# Patient Record
Sex: FEMALE | Race: WHITE | NOT HISPANIC OR LATINO | Employment: FULL TIME | URBAN - METROPOLITAN AREA
[De-identification: names, ages, dates, MRNs, and addresses within clinical notes are randomized per-mention and may not be internally consistent; named-entity substitution may affect disease eponyms.]

---

## 2022-03-17 ENCOUNTER — TELEPHONE (OUTPATIENT)
Dept: OBSTETRICS AND GYNECOLOGY | Facility: CLINIC | Age: 42
End: 2022-03-17
Payer: COMMERCIAL

## 2022-03-28 ENCOUNTER — PROCEDURE VISIT (OUTPATIENT)
Dept: OBSTETRICS AND GYNECOLOGY | Facility: CLINIC | Age: 42
End: 2022-03-28
Payer: COMMERCIAL

## 2022-03-28 ENCOUNTER — OFFICE VISIT (OUTPATIENT)
Dept: OBSTETRICS AND GYNECOLOGY | Facility: CLINIC | Age: 42
End: 2022-03-28
Payer: COMMERCIAL

## 2022-03-28 VITALS
HEIGHT: 68 IN | SYSTOLIC BLOOD PRESSURE: 110 MMHG | WEIGHT: 148.63 LBS | BODY MASS INDEX: 22.53 KG/M2 | DIASTOLIC BLOOD PRESSURE: 62 MMHG

## 2022-03-28 DIAGNOSIS — N93.9 ABNORMAL UTERINE BLEEDING (AUB): Primary | ICD-10-CM

## 2022-03-28 DIAGNOSIS — N93.9 ABNORMAL UTERINE BLEEDING (AUB): ICD-10-CM

## 2022-03-28 PROCEDURE — 99204 PR OFFICE/OUTPT VISIT, NEW, LEVL IV, 45-59 MIN: ICD-10-PCS | Mod: S$GLB,,, | Performed by: OBSTETRICS & GYNECOLOGY

## 2022-03-28 PROCEDURE — 99204 OFFICE O/P NEW MOD 45 MIN: CPT | Mod: S$GLB,,, | Performed by: OBSTETRICS & GYNECOLOGY

## 2022-03-28 PROCEDURE — 76856 US EXAM PELVIC COMPLETE: CPT | Mod: S$GLB,,, | Performed by: OBSTETRICS & GYNECOLOGY

## 2022-03-28 PROCEDURE — 76856 PR  ECHO,PELVIC (NONOBSTETRIC): ICD-10-PCS | Mod: S$GLB,,, | Performed by: OBSTETRICS & GYNECOLOGY

## 2022-03-28 RX ORDER — ONDANSETRON 4 MG/1
4 TABLET, FILM COATED ORAL EVERY 6 HOURS PRN
Qty: 20 TABLET | Refills: 1 | Status: SHIPPED | OUTPATIENT
Start: 2022-03-28 | End: 2022-04-04

## 2022-03-28 RX ORDER — TRANEXAMIC ACID 650 MG/1
650 TABLET ORAL 3 TIMES DAILY
Qty: 15 TABLET | Refills: 1 | Status: SHIPPED | OUTPATIENT
Start: 2022-03-28 | End: 2022-04-02

## 2022-03-28 RX ORDER — NORGESTIMATE AND ETHINYL ESTRADIOL 7DAYSX3 28
1 KIT ORAL DAILY
COMMUNITY
End: 2022-03-28

## 2022-03-28 RX ORDER — NORGESTIMATE AND ETHINYL ESTRADIOL 0.25-0.035
1 KIT ORAL DAILY
Qty: 28 TABLET | Refills: 11 | Status: SHIPPED | OUTPATIENT
Start: 2022-03-28 | End: 2023-05-09

## 2022-03-28 NOTE — PROGRESS NOTES
Ultrasound completed and read     the uterus measures 8.9 cm in length, with an endometrial thickness of 0.49 cm. The right ovary is normal.  The left ovary contains 2 simple cysts measuring 3 cm and 2 cm

## 2022-03-29 NOTE — PROGRESS NOTES
Gynecology visit  History & Physical      SUBJECTIVE:     History of Present Illness:   41 y.o. female presents complaining of 3 months of amenorrhea and then having menorrhagia for 27 days.  States that when she had amenorrhea, she had egg white like discharge similar to ovulation type discharge. Reports that it has resolved. She and her  use vasectomy for contraception. She was a patient of Dr. Romeo prior to his passing.  She saw all Dr. Bernard for this problem.  Pelvic ultrasound in his office showed an endometrial stripe greater than 2 cm.  She was given Lo Loestrin, but reports she has continued to have heavy bleeding.  Reports her hormone panel was normal.  She endorses symptoms of anemia.       Chief Complaint   Patient presents with    irregular bleeding     Pt is new to the clinic.       Review of patient's allergies indicates:  No Known Allergies    Current Outpatient Medications   Medication Sig Dispense Refill    norgestimate-ethinyl estradioL (ORTHO-CYCLEN) 0.25-35 mg-mcg per tablet Take 1 tablet by mouth once daily. 28 tablet 11    ondansetron (ZOFRAN) 4 MG tablet Take 1 tablet (4 mg total) by mouth every 6 (six) hours as needed for Nausea. 20 tablet 1    tranexamic acid (LYSTEDA) 650 mg tablet Take 1 tablet (650 mg total) by mouth 3 (three) times daily. for 5 days 15 tablet 1     No current facility-administered medications for this visit.     OB History        4    Para        Term                AB        Living   3       SAB        IAB        Ectopic        Multiple        Live Births                 No LMP recorded (lmp unknown). (Menstrual status: Birth Control).      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      OBJECTIVE:     Vital Signs (Most Recent)  BP: 110/62 (03/28/22 1332)  5' 8" (1.727 m)  67.4 kg (148 lb 9.6 oz)     Physical Exam:  Physical Exam  Vitals reviewed.   Constitutional:       General: She is not in acute distress.     Appearance: Normal appearance. She is normal weight. She is not ill-appearing.   HENT:      Head: Normocephalic and atraumatic.   Pulmonary:      Effort: Pulmonary effort is normal.   Neurological:      General: No focal deficit present.      Mental Status: She is alert and oriented to person, place, and time.   Psychiatric:         Mood and Affect: Mood normal.         Behavior: Behavior normal.         Diagnostic Results:  Pelvic ultrasound performed on 03/28/2022:  Uterus measures 8.9 x 5.6 x 4.9 cm  Endometrial thickness measures 4.9 mm  Right ovary is normal in appearance  Left ovary contains 2 simple cyst measuring 3cm and 2 cm    ASSESSMENT/PLAN:       ICD-10-CM ICD-9-CM   1. Abnormal uterine bleeding (AUB)  N93.9 626.9       PLAN:    --Instructed patient to take Lysteda for 5 days to decrease uterine bleeding.    --If she is still experiencing heavy bleeding on Sunday, recommend OCP taper with 4 pills on Sunday, followed by 3 pills on Monday, followed by 2 pills the next day and then 1 pill daily.  --Zofran ordered for estrogen induced nausea with OCP taper.  --Reviewed treatment options of hormones verses progesterone IUD versus endometrial ablation and lastly hysterectomy.  Patient is interested in endometrial ablation.  --Pelvic ultrasound performed today that showed an endometrial stripe of 4.8 mm, which is significantly less than 2 cm.  This heavy bleeding was likely her uterus sloughing the thick endometrium.  --CBC ordered to assess hemoglobin level.  --Recommend oral iron.  --Follow-up in 2-4 weeks " to assess bleeding symptoms and further discuss treatment options.  --Pt thankful for care provided.     Kriss Vega MD   Gynecology    [address]  [address]  [place] [address] [phone]

## 2022-04-08 ENCOUNTER — TELEPHONE (OUTPATIENT)
Dept: OBSTETRICS AND GYNECOLOGY | Facility: CLINIC | Age: 42
End: 2022-04-08
Payer: COMMERCIAL

## 2022-04-08 DIAGNOSIS — N93.9 ABNORMAL UTERINE BLEEDING: ICD-10-CM

## 2022-04-08 DIAGNOSIS — R53.83 LETHARGY: Primary | ICD-10-CM

## 2022-04-08 NOTE — TELEPHONE ENCOUNTER
----- Message from Socorro Lou sent at 4/8/2022 10:08 AM CDT -----  Contact: pt  Type: Needs Medical Advice         Who Called: pt  Best Call Back Number:[phone]  Additional Information: Requesting a call back regarding  pt called last Thursday to have orders for bloodwork to be sent to [facility]. Pt hasn't hear back from office and would like to have the labs done. Pt would like office to call her.   Please Advise- Thank you

## 2022-06-01 ENCOUNTER — OFFICE VISIT (OUTPATIENT)
Dept: OBSTETRICS AND GYNECOLOGY | Facility: CLINIC | Age: 42
End: 2022-06-01
Payer: COMMERCIAL

## 2022-06-01 VITALS
DIASTOLIC BLOOD PRESSURE: 78 MMHG | HEIGHT: 68 IN | WEIGHT: 142.81 LBS | SYSTOLIC BLOOD PRESSURE: 122 MMHG | BODY MASS INDEX: 21.64 KG/M2

## 2022-06-01 DIAGNOSIS — N93.9 ABNORMAL UTERINE BLEEDING: Primary | ICD-10-CM

## 2022-06-01 DIAGNOSIS — F41.9 ANXIETY: ICD-10-CM

## 2022-06-01 PROCEDURE — 88305 TISSUE EXAM BY PATHOLOGIST: ICD-10-PCS | Mod: 26,,, | Performed by: PATHOLOGY

## 2022-06-01 PROCEDURE — 99214 PR OFFICE/OUTPT VISIT, EST, LEVL IV, 30-39 MIN: ICD-10-PCS | Mod: 25,S$GLB,, | Performed by: OBSTETRICS & GYNECOLOGY

## 2022-06-01 PROCEDURE — 58100 ENDOMETRIAL BIOPSY: ICD-10-PCS | Mod: S$GLB,,, | Performed by: OBSTETRICS & GYNECOLOGY

## 2022-06-01 PROCEDURE — 58100 BIOPSY OF UTERUS LINING: CPT | Mod: S$GLB,,, | Performed by: OBSTETRICS & GYNECOLOGY

## 2022-06-01 PROCEDURE — 99214 OFFICE O/P EST MOD 30 MIN: CPT | Mod: 25,S$GLB,, | Performed by: OBSTETRICS & GYNECOLOGY

## 2022-06-01 PROCEDURE — 88305 TISSUE EXAM BY PATHOLOGIST: CPT | Mod: 26,,, | Performed by: PATHOLOGY

## 2022-06-01 PROCEDURE — 88305 TISSUE EXAM BY PATHOLOGIST: CPT | Performed by: PATHOLOGY

## 2022-06-01 RX ORDER — TRANEXAMIC ACID 650 MG/1
1300 TABLET ORAL 3 TIMES DAILY
Qty: 15 TABLET | Refills: 11 | Status: SHIPPED | OUTPATIENT
Start: 2022-06-01 | End: 2022-06-06

## 2022-06-01 RX ORDER — SUCRALFATE 1 G/1
1 TABLET ORAL 4 TIMES DAILY
COMMUNITY

## 2022-06-01 RX ORDER — PANTOPRAZOLE SODIUM 40 MG/1
80 FOR SUSPENSION ORAL DAILY
COMMUNITY

## 2022-06-01 RX ORDER — BUPROPION HYDROCHLORIDE 150 MG/1
150 TABLET, EXTENDED RELEASE ORAL DAILY
Qty: 30 TABLET | Refills: 11 | Status: SHIPPED | OUTPATIENT
Start: 2022-06-01 | End: 2022-09-28 | Stop reason: SDUPTHER

## 2022-06-01 NOTE — PROCEDURES
Endometrial biopsy    Date/Time: 6/1/2022 2:15 PM  Performed by: Kriss Vega MD  Authorized by: Kriss Vega MD     Consent:     Consent obtained:  Written    Consent given by:  Patient    Patient questions answered: yes      Patient agrees, verbalizes understanding, and wants to proceed: yes      Instructions and paperwork completed: yes    Indication:     Indications: Menorrhagia    Pre-procedure:     Pre-procedure timeout performed: yes    Procedure:     Procedure: endometrial biopsy with Pipelle      Cervix cleaned and prepped: yes      A paracervical block was performed: yes      Uterus sound depth (cm):  9    Specimen collected: specimen collected and sent to pathology      Patient tolerated procedure well with no complications: yes    Comments:     Procedure comments:          Kriss Vega MD   Gynecology    [address]  [address]  [place] [address] [phone]

## 2022-06-01 NOTE — PROGRESS NOTES
"Gynecology Visit   EMB    Subjective:       Patient ID: Maryan Moore is a 41 y.o. female.    Chief Complaint:  Follow-up (Pt presents to discuss treatment options )      History of Present Illness  41-year-old presents for follow-up for abnormal uterine bleeding.  Reports her bleeding did stop after taking Lysteda with OCP taper.  Reports she began bleeding again a week later despite continuing birth control pills.  Reports continued mucous discharge with bleeding. She reports an increase in anxiety. Reports she has been taking Prozac 10 mg for the past month with some improvement.  Reports a decrease in libido with this medication. Desires treatment.  She is concerned that she is developing a gastric ulcer, and has started daily Protonix.    She is scheduled to go out of the country on a delayed honeymoon with her  starting  x1 week.    GYN & OB History  No LMP recorded. (Menstrual status: Birth Control).   Date of Last Pap: No result found  , twins x1        /78 (BP Location: Right arm, Patient Position: Sitting)   Ht 5' 8" (1.727 m)   Wt 64.8 kg (142 lb 12.8 oz)   BMI 21.71 kg/m²     Objective:    Physical Exam:   Constitutional: She is oriented to person, place, and time. She appears well-developed and well-nourished.    HENT:   Head: Normocephalic and atraumatic.       Pulmonary/Chest: Effort normal.          Genitourinary:    Uterus normal.   The external female genitalia was normal.   There is vaginal discharge in the vagina. Uterus is not tender.    Genitourinary Comments: Endometrial biopsy performed.  See procedure note for details.                 Neurological: She is alert and oriented to person, place, and time.     Psychiatric: She has a normal mood and affect.            Pelvic ultrasound performed on 2022:  Uterus measures 8.9 x 5.6 x 4.9 cm  Endometrial thickness measures 4.9 mm  Right ovary is normal in appearance  Left ovary contains 2 simple cyst measuring 3cm " and 2 cm    Assessment:        1. Abnormal uterine bleeding    2. Anxiety             Plan:      --Continued irregular bleeding despite hormonal treatment.   --Recommend endometrial biopsy.  Performed today without difficulty.  Patient tolerated well.  --Patient is planning to swim and have intercourse on her trip, so will try to manage bleeding during that 7 days with Lysteda and OCP taper.   --When patient returns, we will further discuss treatment options of IUD versus ablation versus hysterectomy.   --Wellbutrin ordered for anxiety.  Patient counseled on proper use.  --Recommend GI referral/workup.  Patient reports she will follow-up with Dr. Bruner.         Kriss Vega MD   Gynecology    [address]   [address]  [place] [address] [phone]

## 2022-06-06 ENCOUNTER — PATIENT MESSAGE (OUTPATIENT)
Dept: OBSTETRICS AND GYNECOLOGY | Facility: CLINIC | Age: 42
End: 2022-06-06
Payer: COMMERCIAL

## 2022-06-07 LAB
FINAL PATHOLOGIC DIAGNOSIS: NORMAL
GROSS: NORMAL
Lab: NORMAL

## 2022-09-26 ENCOUNTER — PATIENT MESSAGE (OUTPATIENT)
Dept: OBSTETRICS AND GYNECOLOGY | Facility: CLINIC | Age: 42
End: 2022-09-26
Payer: COMMERCIAL

## 2022-09-28 ENCOUNTER — OFFICE VISIT (OUTPATIENT)
Dept: OBSTETRICS AND GYNECOLOGY | Facility: CLINIC | Age: 42
End: 2022-09-28
Payer: COMMERCIAL

## 2022-09-28 VITALS
HEIGHT: 68 IN | SYSTOLIC BLOOD PRESSURE: 116 MMHG | DIASTOLIC BLOOD PRESSURE: 72 MMHG | WEIGHT: 144 LBS | BODY MASS INDEX: 21.82 KG/M2

## 2022-09-28 DIAGNOSIS — N89.8 VAGINAL ODOR: ICD-10-CM

## 2022-09-28 DIAGNOSIS — N91.2 AMENORRHEA: Primary | ICD-10-CM

## 2022-09-28 DIAGNOSIS — E55.9 VITAMIN D DEFICIENCY: ICD-10-CM

## 2022-09-28 DIAGNOSIS — N93.9 ABNORMAL UTERINE BLEEDING: ICD-10-CM

## 2022-09-28 DIAGNOSIS — F41.9 ANXIETY: ICD-10-CM

## 2022-09-28 DIAGNOSIS — L65.9 HAIR LOSS: ICD-10-CM

## 2022-09-28 DIAGNOSIS — R53.82 CHRONIC FATIGUE: ICD-10-CM

## 2022-09-28 PROCEDURE — 81514 NFCT DS BV&VAGINITIS DNA ALG: CPT | Performed by: OBSTETRICS & GYNECOLOGY

## 2022-09-28 PROCEDURE — 99214 OFFICE O/P EST MOD 30 MIN: CPT | Mod: S$GLB,,, | Performed by: OBSTETRICS & GYNECOLOGY

## 2022-09-28 PROCEDURE — 99214 PR OFFICE/OUTPT VISIT, EST, LEVL IV, 30-39 MIN: ICD-10-PCS | Mod: S$GLB,,, | Performed by: OBSTETRICS & GYNECOLOGY

## 2022-09-28 RX ORDER — BUPROPION HYDROCHLORIDE 150 MG/1
150 TABLET, EXTENDED RELEASE ORAL 2 TIMES DAILY
Qty: 60 TABLET | Refills: 11 | Status: SHIPPED | OUTPATIENT
Start: 2022-09-28 | End: 2023-05-09

## 2022-09-28 RX ORDER — ERGOCALCIFEROL 1.25 MG/1
50000 CAPSULE ORAL
Qty: 12 CAPSULE | Refills: 0 | Status: SHIPPED | OUTPATIENT
Start: 2022-09-28 | End: 2023-01-04

## 2022-09-28 NOTE — PROGRESS NOTES
"Gynecology Visit     Subjective:       Patient ID: Maryan Moore is a 42 y.o. female.    Chief Complaint:  vaginal odor and Metrorrhagia      History of Present Illness  42-year-old  presents complaining of vaginal odor and irritation following use of a new sexual lubricant.  Reports that she stop taking Loestrin in July and has not had a period since. She also reports hair loss and continued anxiety.     GYN & OB History  No LMP recorded. (Menstrual status: Birth Control).   Date of Last Pap: 3/22/2021 NILM    OB History    Para Term  AB Living   4         3   SAB IAB Ectopic Multiple Live Births                  # Outcome Date GA Lbr Kana/2nd Weight Sex Delivery Anes PTL Lv   4             3             2             1                     /72 (BP Location: Right arm, Patient Position: Sitting)   Ht 5' 8" (1.727 m)   Wt 65.3 kg (144 lb)   BMI 21.90 kg/m²     Objective:    Physical Exam:   Constitutional: She is oriented to person, place, and time. She appears well-developed and well-nourished.    HENT:   Head: Normocephalic and atraumatic.       Pulmonary/Chest: Effort normal.          Genitourinary: There is vaginal discharge in the vagina.               Neurological: She is alert and oriented to person, place, and time.     Psychiatric: She has a normal mood and affect.          Labs 2022:   FSH: 10.1  Estradiol: 93.6  Vitamin D 28.1    Diagnostic Results:  Pelvic ultrasound performed on 2022:  Uterus measures 8.9 x 5.6 x 4.9 cm  Endometrial thickness measures 4.9 mm  Right ovary is normal in appearance  Left ovary contains 2 simple cyst measuring 3cm and 2 cm    Assessment:        1. Amenorrhea    2. Vaginal odor    3. Chronic fatigue    4. Abnormal uterine bleeding    5. Vitamin D deficiency    6. Anxiety    7. Hair loss             Plan:      Repeat hormone panel ordered including prolactin and TFTs.   Vaginosis panel sent  3.   Discussed " starting treatment for Vitamin D deficiency. Pt is symptomatic with hair loss and fatigue. Pt would like to start high dose oral Vit D supplements. Drisdol x 12 weeks ordered.   4.     Increase Wellbutrin to 150mg BID    Kriss Vega MD   Gynecology    [address]   [address]  [place] [address] [phone]

## 2022-09-29 LAB
BACTERIAL VAGINOSIS DNA: NEGATIVE
CANDIDA GLABRATA DNA: NEGATIVE
CANDIDA KRUSEI DNA: NEGATIVE
CANDIDA RRNA VAG QL PROBE: NEGATIVE
T VAGINALIS RRNA GENITAL QL PROBE: NEGATIVE

## 2022-12-13 ENCOUNTER — PATIENT MESSAGE (OUTPATIENT)
Dept: OBSTETRICS AND GYNECOLOGY | Facility: CLINIC | Age: 42
End: 2022-12-13
Payer: COMMERCIAL

## 2023-01-03 ENCOUNTER — TELEPHONE (OUTPATIENT)
Dept: OBSTETRICS AND GYNECOLOGY | Facility: CLINIC | Age: 43
End: 2023-01-03
Payer: COMMERCIAL

## 2023-01-19 ENCOUNTER — TELEPHONE (OUTPATIENT)
Dept: OBSTETRICS AND GYNECOLOGY | Facility: CLINIC | Age: 43
End: 2023-01-19
Payer: COMMERCIAL

## 2023-01-19 ENCOUNTER — PATIENT MESSAGE (OUTPATIENT)
Dept: OBSTETRICS AND GYNECOLOGY | Facility: CLINIC | Age: 43
End: 2023-01-19
Payer: COMMERCIAL

## 2023-01-24 ENCOUNTER — TELEPHONE (OUTPATIENT)
Dept: OBSTETRICS AND GYNECOLOGY | Facility: CLINIC | Age: 43
End: 2023-01-24
Payer: COMMERCIAL

## 2023-01-25 ENCOUNTER — TELEPHONE (OUTPATIENT)
Dept: OBSTETRICS AND GYNECOLOGY | Facility: CLINIC | Age: 43
End: 2023-01-25

## 2023-02-06 ENCOUNTER — OFFICE VISIT (OUTPATIENT)
Dept: OBSTETRICS AND GYNECOLOGY | Facility: CLINIC | Age: 43
End: 2023-02-06
Payer: COMMERCIAL

## 2023-02-06 VITALS
DIASTOLIC BLOOD PRESSURE: 68 MMHG | BODY MASS INDEX: 22.22 KG/M2 | SYSTOLIC BLOOD PRESSURE: 116 MMHG | WEIGHT: 146.63 LBS | HEIGHT: 68 IN

## 2023-02-06 DIAGNOSIS — N92.1 MENORRHAGIA WITH IRREGULAR CYCLE: Primary | ICD-10-CM

## 2023-02-06 DIAGNOSIS — D50.0 CHRONIC BLOOD LOSS ANEMIA: ICD-10-CM

## 2023-02-06 PROCEDURE — 99214 PR OFFICE/OUTPT VISIT, EST, LEVL IV, 30-39 MIN: ICD-10-PCS | Mod: S$GLB,,, | Performed by: OBSTETRICS & GYNECOLOGY

## 2023-02-06 PROCEDURE — 99214 OFFICE O/P EST MOD 30 MIN: CPT | Mod: S$GLB,,, | Performed by: OBSTETRICS & GYNECOLOGY

## 2023-02-06 RX ORDER — IBUPROFEN 800 MG/1
800 TABLET ORAL EVERY 8 HOURS PRN
COMMUNITY
Start: 2022-03-18 | End: 2023-03-18

## 2023-02-06 NOTE — PROGRESS NOTES
Gynecology Preoperative Exam  History & Physical      SUBJECTIVE:     History of Present Illness:  42-year-old  presents to discuss treatment for menorrhagia.  She reports that her bleeding has increased and she bled through her pants while at a restaurant last month.  She desires definitive treatment with hysterectomy.    Chief Complaint   Patient presents with    discuss surgery        Review of patient's allergies indicates:  No Known Allergies    Current Outpatient Medications   Medication Sig Dispense Refill    buPROPion (WELLBUTRIN SR) 150 MG TBSR 12 hr tablet Take 1 tablet (150 mg total) by mouth 2 (two) times daily. 60 tablet 11    ergocalciferol (ERGOCALCIFEROL) 50,000 unit Cap TAKE 1 CAPSULE BY MOUTH ONE TIME PER WEEK 4 capsule 2    ibuprofen (ADVIL,MOTRIN) 800 MG tablet Take 800 mg by mouth every 8 (eight) hours as needed.      pantoprazole (PROTONIX) 40 mg suspension Take 80 mg by mouth once daily.      sucralfate (CARAFATE) 1 gram tablet Take 1 g by mouth 4 (four) times daily.      norgestimate-ethinyl estradioL (ORTHO-CYCLEN) 0.25-35 mg-mcg per tablet Take 1 tablet by mouth once daily. (Patient not taking: Reported on 2023) 28 tablet 11     No current facility-administered medications for this visit.     OB History          4    Para        Term                AB        Living   3         SAB        IAB        Ectopic        Multiple        Live Births                 No LMP recorded. (Menstrual status: Birth Control).      Past Medical History:   Diagnosis Date    Abnormal Pap smear of cervix     Anemia     Stomach ulcer      Past Surgical History:   Procedure Laterality Date    APPENDECTOMY      WISDOM TOOTH EXTRACTION       Family History   Problem Relation Age of Onset    Cancer Mother      Social History     Tobacco Use    Smoking status: Never    Smokeless tobacco: Never   Substance Use Topics    Alcohol use: Yes    Drug use: Never        OBJECTIVE:     Vital Signs (Most  "Recent)  BP: 116/68 (02/06/23 0941)  5' 8" (1.727 m)  66.5 kg (146 lb 9.6 oz)     Physical Exam:  Physical Exam  Vitals reviewed.   Constitutional:       Appearance: She is well-developed.   HENT:      Head: Normocephalic and atraumatic.   Pulmonary:      Effort: Pulmonary effort is normal.   Neurological:      Mental Status: She is alert and oriented to person, place, and time.       Labs 1/18/2023:   Hgb 10.1  TSH: 1.69  12/21/2022: FSH: 4.6       Diagnostic Results:  Pelvic ultrasound performed on 03/28/2022:  Uterus measures 8.9 x 5.6 x 4.9 cm  Endometrial thickness measures 4.9 mm  Right ovary is normal in appearance  Left ovary contains 2 simple cyst measuring 3cm and 2 cm    EMB on 6/1/2022:  Inactive endometrium with progesterone hormone effect.  No hyperplasia, atypia, or malignancy    Date of Last Pap: 3/22/2021 NILM        Assessment:        1. Menorrhagia with irregular cycle    2. Chronic blood loss anemia             Plan:      --Again reviewed treatment options today including medication, endometrial ablation, and hysterectomy.  Patient strongly desires definitive treatment with hysterectomy.  --Will refer to Heme-Onc for iron infusions secondary to anemia.  --Counseled patient on the risks, benefits, alternatives, indications for hysterectomy with risks including pain, bleeding, infection, damage to surrounding organs, DVT/ PE, and rarely death.   --Surgery is tentatively scheduled for February 24.   --Will sign consents at bedside.   --Follow up in the clinic 2 weeks postop.       Kriss Vega MD   Gynecology    [facility]  [address]  [place] [address] [phone]     "

## 2023-02-10 ENCOUNTER — PATIENT MESSAGE (OUTPATIENT)
Dept: OBSTETRICS AND GYNECOLOGY | Facility: CLINIC | Age: 43
End: 2023-02-10
Payer: COMMERCIAL

## 2023-02-22 ENCOUNTER — PATIENT MESSAGE (OUTPATIENT)
Dept: OBSTETRICS AND GYNECOLOGY | Facility: CLINIC | Age: 43
End: 2023-02-22
Payer: COMMERCIAL

## 2023-02-23 ENCOUNTER — TELEPHONE (OUTPATIENT)
Dept: OBSTETRICS AND GYNECOLOGY | Facility: CLINIC | Age: 43
End: 2023-02-23
Payer: COMMERCIAL

## 2023-04-17 ENCOUNTER — OFFICE VISIT (OUTPATIENT)
Dept: OBSTETRICS AND GYNECOLOGY | Facility: CLINIC | Age: 43
End: 2023-04-17
Payer: COMMERCIAL

## 2023-04-17 VITALS
HEIGHT: 68 IN | BODY MASS INDEX: 22.58 KG/M2 | DIASTOLIC BLOOD PRESSURE: 67 MMHG | WEIGHT: 149 LBS | SYSTOLIC BLOOD PRESSURE: 110 MMHG

## 2023-04-17 DIAGNOSIS — D50.0 CHRONIC BLOOD LOSS ANEMIA: ICD-10-CM

## 2023-04-17 DIAGNOSIS — N92.1 MENORRHAGIA WITH IRREGULAR CYCLE: Primary | ICD-10-CM

## 2023-04-17 PROCEDURE — 99214 PR OFFICE/OUTPT VISIT, EST, LEVL IV, 30-39 MIN: ICD-10-PCS | Mod: S$GLB,,, | Performed by: OBSTETRICS & GYNECOLOGY

## 2023-04-17 PROCEDURE — 99214 OFFICE O/P EST MOD 30 MIN: CPT | Mod: S$GLB,,, | Performed by: OBSTETRICS & GYNECOLOGY

## 2023-04-17 NOTE — PROGRESS NOTES
"Gynecology Preoperative Exam  History & Physical      SUBJECTIVE:     History of Present Illness:  Patient is a 42 y.o. female presents for her preoperative exam for robotic hysterectomy for menorrhagia to anemia after failing medical management.     Chief Complaint   Patient presents with    Pre-op Exam     Pt is here for a Pre Op exam visit for SRG**Robotic Hysterectomy on 23       Review of patient's allergies indicates:  No Known Allergies    Current Outpatient Medications   Medication Sig Dispense Refill    buPROPion (WELLBUTRIN SR) 150 MG TBSR 12 hr tablet Take 1 tablet (150 mg total) by mouth 2 (two) times daily. 60 tablet 11    ergocalciferol (ERGOCALCIFEROL) 50,000 unit Cap TAKE 1 CAPSULE BY MOUTH ONE TIME PER WEEK 4 capsule 2    norgestimate-ethinyl estradioL (ORTHO-CYCLEN) 0.25-35 mg-mcg per tablet Take 1 tablet by mouth once daily. (Patient not taking: Reported on 2023) 28 tablet 11    pantoprazole (PROTONIX) 40 mg suspension Take 80 mg by mouth once daily.      sucralfate (CARAFATE) 1 gram tablet Take 1 g by mouth 4 (four) times daily.       No current facility-administered medications for this visit.     OB History          4    Para        Term                AB   1    Living   3         SAB   1    IAB        Ectopic        Multiple        Live Births                 No LMP recorded (lmp unknown). (Menstrual status: Birth Control).      Past Medical History:   Diagnosis Date    Abnormal Pap smear of cervix     Anemia     Stomach ulcer      Past Surgical History:   Procedure Laterality Date    APPENDECTOMY      WISDOM TOOTH EXTRACTION       Family History   Problem Relation Age of Onset    Cancer Mother      Social History     Tobacco Use    Smoking status: Never    Smokeless tobacco: Never   Substance Use Topics    Alcohol use: Yes    Drug use: Never        OBJECTIVE:     Vital Signs (Most Recent)  BP: 110/67 (23 0941)  5' 8" (1.727 m)  67.6 kg (149 lb)     Physical " Exam:  Physical Exam  Vitals reviewed.   Constitutional:       Appearance: Normal appearance.   HENT:      Head: Normocephalic and atraumatic.   Pulmonary:      Effort: Pulmonary effort is normal.   Neurological:      General: No focal deficit present.      Mental Status: She is alert and oriented to person, place, and time.   Psychiatric:         Mood and Affect: Mood normal.         Behavior: Behavior normal.         Labs:  1/18/2023  TSH: 1.69  12/21/2022: FSH: 4.6    3/31/2023: Hgb 12.0     Diagnostic Results:  Pelvic ultrasound performed on 03/28/2022:  Uterus measures 8.9 x 5.6 x 4.9 cm  Endometrial thickness measures 4.9 mm  Right ovary is normal in appearance  Left ovary contains 2 simple cyst measuring 3cm and 2 cm     EMB on 6/1/2022:  Inactive endometrium with progesterone hormone effect.  No hyperplasia, atypia, or malignancy     Date of Last Pap: 3/22/2021 NILM    ASSESSMENT/PLAN:       ICD-10-CM ICD-9-CM   1. Menorrhagia with irregular cycle  N92.1 626.2   2. Chronic blood loss anemia  D50.0 280.0       PLAN:  --Again reviewed treatment options today including medication, endometrial ablation, and hysterectomy.  Patient strongly desires definitive treatment with hysterectomy.  --Hgb was most recently 12.0 following iron infusions with Dr. Nieto.   --Counseled patient on the risks, benefits, alternatives, indications for hysterectomy with risks including pain, bleeding, infection, damage to surrounding organs, DVT/ PE, and rarely death.   --Consent reviewed and signed with patient.   --Surgery is scheduled for April 26.   --Follow up in the clinic 2 weeks postop.       Kriss Vega MD   Gynecology    [facility]    [address]  [place] [address] [phone]

## 2023-04-26 ENCOUNTER — OUTSIDE PLACE OF SERVICE (OUTPATIENT)
Dept: OBSTETRICS AND GYNECOLOGY | Facility: CLINIC | Age: 43
End: 2023-04-26

## 2023-04-26 PROCEDURE — 58571 TLH W/T/O 250 G OR LESS: CPT | Mod: ,,, | Performed by: OBSTETRICS & GYNECOLOGY

## 2023-04-26 PROCEDURE — 58571 PR LAPAROSCOPY W TOT HYSTERECTUTERUS <=250 GRAM  W TUBE/OVARY: ICD-10-PCS | Mod: ,,, | Performed by: OBSTETRICS & GYNECOLOGY

## 2023-05-02 ENCOUNTER — PATIENT MESSAGE (OUTPATIENT)
Dept: OBSTETRICS AND GYNECOLOGY | Facility: CLINIC | Age: 43
End: 2023-05-02
Payer: COMMERCIAL

## 2023-05-03 ENCOUNTER — TELEPHONE (OUTPATIENT)
Dept: OBSTETRICS AND GYNECOLOGY | Facility: CLINIC | Age: 43
End: 2023-05-03
Payer: COMMERCIAL

## 2023-05-04 ENCOUNTER — TELEPHONE (OUTPATIENT)
Dept: OBSTETRICS AND GYNECOLOGY | Facility: CLINIC | Age: 43
End: 2023-05-04
Payer: COMMERCIAL

## 2023-05-09 ENCOUNTER — OFFICE VISIT (OUTPATIENT)
Dept: OBSTETRICS AND GYNECOLOGY | Facility: CLINIC | Age: 43
End: 2023-05-09
Payer: COMMERCIAL

## 2023-05-09 VITALS
BODY MASS INDEX: 22.28 KG/M2 | HEIGHT: 68 IN | DIASTOLIC BLOOD PRESSURE: 61 MMHG | HEART RATE: 79 BPM | SYSTOLIC BLOOD PRESSURE: 103 MMHG | WEIGHT: 147 LBS

## 2023-05-09 DIAGNOSIS — N92.0 MENORRHAGIA WITH REGULAR CYCLE: Primary | ICD-10-CM

## 2023-05-09 DIAGNOSIS — D50.0 CHRONIC BLOOD LOSS ANEMIA: ICD-10-CM

## 2023-05-09 PROCEDURE — 99024 PR POST-OP FOLLOW-UP VISIT: ICD-10-PCS | Mod: S$GLB,,, | Performed by: OBSTETRICS & GYNECOLOGY

## 2023-05-09 PROCEDURE — 99024 POSTOP FOLLOW-UP VISIT: CPT | Mod: S$GLB,,, | Performed by: OBSTETRICS & GYNECOLOGY

## 2023-05-09 RX ORDER — IBUPROFEN 600 MG/1
600 TABLET ORAL
COMMUNITY
Start: 2023-04-26

## 2023-05-09 NOTE — PROGRESS NOTES
"Gynecology Visit   Post-op    Subjective:       Patient ID: Maryan Moore is a 42 y.o. female.    Chief Complaint:  Post-op Evaluation      History of Present Illness  Maryan Moore is a 42 y.o. female who presents for her postoperative appointment following robotic hysterectomy for menorrhagia to anemia. She reports that she did experience an allergic reaction at her port sites that resolved with removal of her steri strips. She is thrilled with the results of her surgery and feels markedly better. She is planning to start working from home tomorrow.     GYN & OB History  No LMP recorded (lmp unknown). Patient has had a hysterectomy.   Date of Last Pap: 3/22/2021 NILM    OB History    Para Term  AB Living   4       1 3   SAB IAB Ectopic Multiple Live Births   1              # Outcome Date GA Lbr Kana/2nd Weight Sex Delivery Anes PTL Lv   4             3             2             1 SAB                    /61 (BP Location: Right arm, Patient Position: Sitting)   Pulse 79   Ht 5' 8" (1.727 m)   Wt 66.7 kg (147 lb)   LMP  (LMP Unknown)   BMI 22.35 kg/m²     Objective:    Physical Exam:   Constitutional: She is oriented to person, place, and time. She appears well-developed and well-nourished.    HENT:   Head: Normocephalic and atraumatic.       Pulmonary/Chest: Effort normal.        Abdominal:   Port sites x4 are healing well                     Neurological: She is alert and oriented to person, place, and time.     Psychiatric: She has a normal mood and affect.        Assessment:        1. Menorrhagia with regular cycle    2. Chronic blood loss anemia             Plan:      Pt is doing very well post-operatively.   Reviewed benign pathology of 164g uterus.  Continue pelvic rest.  Follow up in 4 weeks for speculum exam.   OK to work from home.       Kriss Vega MD   Gynecology    [address]  [address]  [place] [address] [phone]     "

## 2023-05-16 ENCOUNTER — TELEPHONE (OUTPATIENT)
Dept: OBSTETRICS AND GYNECOLOGY | Facility: CLINIC | Age: 43
End: 2023-05-16
Payer: COMMERCIAL

## 2023-06-06 ENCOUNTER — OFFICE VISIT (OUTPATIENT)
Dept: OBSTETRICS AND GYNECOLOGY | Facility: CLINIC | Age: 43
End: 2023-06-06
Payer: COMMERCIAL

## 2023-06-06 VITALS
WEIGHT: 147.19 LBS | DIASTOLIC BLOOD PRESSURE: 72 MMHG | SYSTOLIC BLOOD PRESSURE: 116 MMHG | HEIGHT: 68 IN | BODY MASS INDEX: 22.31 KG/M2

## 2023-06-06 DIAGNOSIS — D50.0 CHRONIC BLOOD LOSS ANEMIA: ICD-10-CM

## 2023-06-06 DIAGNOSIS — N92.1 MENORRHAGIA WITH IRREGULAR CYCLE: Primary | ICD-10-CM

## 2023-06-06 PROCEDURE — 99024 PR POST-OP FOLLOW-UP VISIT: ICD-10-PCS | Mod: S$GLB,,, | Performed by: OBSTETRICS & GYNECOLOGY

## 2023-06-06 PROCEDURE — 99024 POSTOP FOLLOW-UP VISIT: CPT | Mod: S$GLB,,, | Performed by: OBSTETRICS & GYNECOLOGY

## 2023-06-06 NOTE — PROGRESS NOTES
"Gynecology Visit   Post-op    Subjective:       Patient ID: Maryan Moore is a 42 y.o. female.    Chief Complaint:  Post-op Evaluation      History of Present Illness  Maryan Moore is a 42 y.o. female who presents for her postoperative appointment following robotic hysterectomy on .  She has no complaints today. Denies pain, vaginal bleeding, or vaginal discharge. She is doing well. Denies pain or vaginal bleeding.     GYN & OB History  S/p hysterectomy  Date of Last Pap: 3/22/2021 NILM    OB History    Para Term  AB Living   4       1 3   SAB IAB Ectopic Multiple Live Births   1              # Outcome Date GA Lbr Kana/2nd Weight Sex Delivery Anes PTL Lv   4             3             2             1 SAB                    /72 (BP Location: Right arm, Patient Position: Sitting)   Ht 5' 8" (1.727 m)   Wt 66.8 kg (147 lb 3.2 oz)   LMP  (LMP Unknown)   BMI 22.38 kg/m²     Objective:    Physical Exam:   Constitutional: She is oriented to person, place, and time. She appears well-developed and well-nourished.    HENT:   Head: Normocephalic and atraumatic.       Pulmonary/Chest: Effort normal.        Abdominal:   Port sites x4 are well healed     Genitourinary:    Genitourinary Comments: Vaginal cuff is intact on bimanual exam with good vaginal length.  No visible suture on speculum exam.  No tenderness on exam                 Neurological: She is alert and oriented to person, place, and time.     Psychiatric: She has a normal mood and affect.          Assessment:        1. Menorrhagia with irregular cycle    2. Chronic blood loss anemia             Plan:         --Patient is doing very well postoperatively.   --May resume intercourse, exercise, and baths/swimming.  --Return precautions reviewed.  --Pap smears no longer indicated.  --Follow-up in the office in 1 year or sooner as needed.       Kriss Vega MD   Gynecology    [address]   [address]  [place], [place] " [address] [phone]

## 2023-11-21 ENCOUNTER — PATIENT MESSAGE (OUTPATIENT)
Dept: OBSTETRICS AND GYNECOLOGY | Facility: CLINIC | Age: 43
End: 2023-11-21
Payer: COMMERCIAL

## 2024-02-26 RX ORDER — ERGOCALCIFEROL 1.25 MG/1
50000 CAPSULE ORAL
Qty: 4 CAPSULE | Refills: 47 | Status: SHIPPED | OUTPATIENT
Start: 2024-02-26

## 2024-04-16 ENCOUNTER — OFFICE VISIT (OUTPATIENT)
Dept: OBSTETRICS AND GYNECOLOGY | Facility: CLINIC | Age: 44
End: 2024-04-16
Payer: COMMERCIAL

## 2024-04-16 VITALS
HEIGHT: 68 IN | DIASTOLIC BLOOD PRESSURE: 72 MMHG | BODY MASS INDEX: 22.76 KG/M2 | SYSTOLIC BLOOD PRESSURE: 118 MMHG | WEIGHT: 150.19 LBS

## 2024-04-16 DIAGNOSIS — R53.82 CHRONIC FATIGUE: ICD-10-CM

## 2024-04-16 DIAGNOSIS — R63.5 WEIGHT GAIN: Primary | ICD-10-CM

## 2024-04-16 DIAGNOSIS — R68.82 LOW LIBIDO: ICD-10-CM

## 2024-04-16 DIAGNOSIS — L65.9 HAIR LOSS: ICD-10-CM

## 2024-04-16 PROCEDURE — 99214 OFFICE O/P EST MOD 30 MIN: CPT | Mod: S$GLB,,, | Performed by: OBSTETRICS & GYNECOLOGY

## 2024-04-16 RX ORDER — LANOLIN ALCOHOL/MO/W.PET/CERES
100 CREAM (GRAM) TOPICAL DAILY
COMMUNITY

## 2024-04-16 RX ORDER — LISDEXAMFETAMINE DIMESYLATE 20 MG/1
20 CAPSULE ORAL
COMMUNITY

## 2024-04-16 NOTE — PROGRESS NOTES
"Gynecology Visit     Subjective:       Patient ID: Maryan Moore is a 43 y.o. female.    Chief Complaint:  hormones       History of Present Illness  42yo  presents to discuss her hormone levels. She reports that she had her hormones checked at [facility]. Her estrogen and TSH were normal. Her progesterone and testosterone were very low. She also states that her Vit D and Vit B12 were low dispite her taking 50K units of Vit D weekly and daily Vit B12.     She reports hair loss, weight gain, low libido, and fatigue. She has chronic low back pain and required a course of steroids in 2023. She reports that these problems began then.     GYN & OB History  No LMP recorded (lmp unknown). Patient has had a hysterectomy.       OB History    Para Term  AB Living   4       1 3   SAB IAB Ectopic Multiple Live Births   1              # Outcome Date GA Lbr Kana/2nd Weight Sex Type Anes PTL Lv   4             3             2             1 SAB                    /72 (BP Location: Right arm, Patient Position: Sitting)   Ht 5' 8" (1.727 m)   Wt 68.1 kg (150 lb 3.2 oz)   LMP  (LMP Unknown)   BMI 22.84 kg/m²     Objective:    Physical Exam:   Constitutional: She is oriented to person, place, and time. She appears well-developed and well-nourished.    HENT:   Head: Normocephalic and atraumatic.       Pulmonary/Chest: Effort normal.                      Neurological: She is alert and oriented to person, place, and time.     Psychiatric: She has a normal mood and affect.            Assessment:        1. Weight gain    2. Hair loss    3. Chronic fatigue    4. Low libido             Plan:      --Symptoms are likely secondary to steroid course and normal aging.   --Offered OCPs as HRT, progesterone replacement at bedtime, and testosterone therapy. She would like to try the testosterone.   --Continue Vit D Rx. Take with food.   --Recommend starting monthly Vit B12 injections. "   --Follow up in June for annual exam or sooner as needed.       Kriss Vega MD   Gynecology    [address]  [address]  [place] [address] [phone]